# Patient Record
Sex: MALE | Race: BLACK OR AFRICAN AMERICAN | URBAN - METROPOLITAN AREA
[De-identification: names, ages, dates, MRNs, and addresses within clinical notes are randomized per-mention and may not be internally consistent; named-entity substitution may affect disease eponyms.]

---

## 2024-05-14 DIAGNOSIS — G56.02 LEFT CARPAL TUNNEL SYNDROME: Primary | ICD-10-CM

## 2024-06-03 ENCOUNTER — PRE-ADMISSION TESTING (OUTPATIENT)
Dept: PREADMISSION TESTING | Facility: HOSPITAL | Age: 74
End: 2024-06-03
Payer: COMMERCIAL

## 2024-06-03 ENCOUNTER — LAB (OUTPATIENT)
Dept: LAB | Facility: LAB | Age: 74
End: 2024-06-03
Payer: COMMERCIAL

## 2024-06-03 VITALS
HEART RATE: 81 BPM | HEIGHT: 72 IN | TEMPERATURE: 96.8 F | SYSTOLIC BLOOD PRESSURE: 133 MMHG | DIASTOLIC BLOOD PRESSURE: 78 MMHG | OXYGEN SATURATION: 94 % | RESPIRATION RATE: 16 BRPM | WEIGHT: 291.01 LBS | BODY MASS INDEX: 39.42 KG/M2

## 2024-06-03 DIAGNOSIS — Z01.818 PRE-OP TESTING: Primary | ICD-10-CM

## 2024-06-03 DIAGNOSIS — Z01.818 PRE-OP TESTING: ICD-10-CM

## 2024-06-03 DIAGNOSIS — G56.02 LEFT CARPAL TUNNEL SYNDROME: ICD-10-CM

## 2024-06-03 LAB
ANION GAP SERPL CALC-SCNC: 13 MMOL/L (ref 10–20)
BUN SERPL-MCNC: 13 MG/DL (ref 6–23)
CALCIUM SERPL-MCNC: 10.4 MG/DL (ref 8.6–10.3)
CHLORIDE SERPL-SCNC: 91 MMOL/L (ref 98–107)
CO2 SERPL-SCNC: 32 MMOL/L (ref 21–32)
CREAT SERPL-MCNC: 1.12 MG/DL (ref 0.5–1.3)
EGFRCR SERPLBLD CKD-EPI 2021: 70 ML/MIN/1.73M*2
GLUCOSE SERPL-MCNC: 94 MG/DL (ref 74–99)
POTASSIUM SERPL-SCNC: 4.7 MMOL/L (ref 3.5–5.3)
SODIUM SERPL-SCNC: 131 MMOL/L (ref 136–145)

## 2024-06-03 PROCEDURE — 36415 COLL VENOUS BLD VENIPUNCTURE: CPT

## 2024-06-03 PROCEDURE — 80048 BASIC METABOLIC PNL TOTAL CA: CPT

## 2024-06-03 RX ORDER — ATORVASTATIN CALCIUM 10 MG/1
10 TABLET, FILM COATED ORAL DAILY
COMMUNITY

## 2024-06-03 RX ORDER — KETOCONAZOLE 20 MG/ML
1 SHAMPOO, SUSPENSION TOPICAL 2 TIMES WEEKLY
COMMUNITY

## 2024-06-03 RX ORDER — FERROUS SULFATE 325(65) MG
325 TABLET ORAL DAILY
COMMUNITY

## 2024-06-03 RX ORDER — AMLODIPINE BESYLATE 10 MG/1
10 TABLET ORAL DAILY
COMMUNITY

## 2024-06-03 RX ORDER — URSODIOL 300 MG/1
300 CAPSULE ORAL 2 TIMES DAILY
COMMUNITY

## 2024-06-03 RX ORDER — PREDNISONE 5 MG/1
5 TABLET ORAL DAILY
COMMUNITY

## 2024-06-03 RX ORDER — FLUOCINONIDE TOPICAL SOLUTION USP, 0.05% 0.5 MG/ML
1 SOLUTION TOPICAL 2 TIMES DAILY PRN
COMMUNITY

## 2024-06-03 RX ORDER — CHOLECALCIFEROL (VITAMIN D3) 1250 MCG
50000 TABLET ORAL
COMMUNITY

## 2024-06-03 RX ORDER — POTASSIUM CHLORIDE 20 MEQ/1
20 TABLET, EXTENDED RELEASE ORAL DAILY
COMMUNITY

## 2024-06-03 RX ORDER — COLCHICINE 0.6 MG/1
0.6 TABLET ORAL DAILY
COMMUNITY

## 2024-06-03 RX ORDER — HYDROCHLOROTHIAZIDE 12.5 MG/1
12.5 CAPSULE ORAL DAILY
COMMUNITY

## 2024-06-03 RX ORDER — FUROSEMIDE 40 MG/1
40 TABLET ORAL DAILY
COMMUNITY

## 2024-06-03 RX ORDER — LISINOPRIL 40 MG/1
40 TABLET ORAL DAILY
COMMUNITY

## 2024-06-03 RX ORDER — ALBUTEROL SULFATE 90 UG/1
2 AEROSOL, METERED RESPIRATORY (INHALATION) EVERY 4 HOURS PRN
COMMUNITY

## 2024-06-03 RX ORDER — PANTOPRAZOLE SODIUM 40 MG/1
40 TABLET, DELAYED RELEASE ORAL
COMMUNITY

## 2024-06-03 RX ORDER — TRAMADOL HYDROCHLORIDE 50 MG/1
50 TABLET ORAL EVERY 6 HOURS PRN
COMMUNITY

## 2024-06-03 RX ORDER — FOLIC ACID 1 MG/1
1 TABLET ORAL DAILY
COMMUNITY

## 2024-06-03 RX ORDER — ALLOPURINOL 300 MG/1
300 TABLET ORAL DAILY
COMMUNITY

## 2024-06-03 RX ORDER — DOXAZOSIN 4 MG/1
4 TABLET ORAL NIGHTLY
COMMUNITY

## 2024-06-03 RX ORDER — TIOTROPIUM BROMIDE INHALATION SPRAY 3.12 UG/1
2 SPRAY, METERED RESPIRATORY (INHALATION) DAILY
COMMUNITY

## 2024-06-03 ASSESSMENT — DUKE ACTIVITY SCORE INDEX (DASI)
CAN YOU CLIMB A FLIGHT OF STAIRS OR WALK UP A HILL: YES
CAN YOU DO LIGHT WORK AROUND THE HOUSE LIKE DUSTING OR WASHING DISHES: YES
CAN YOU WALK INDOORS, SUCH AS AROUND YOUR HOUSE: YES
CAN YOU DO YARD WORK LIKE RAKING LEAVES, WEEDING OR PUSHING A MOWER: NO
CAN YOU TAKE CARE OF YOURSELF (EAT, DRESS, BATHE, OR USE TOILET): YES
CAN YOU DO MODERATE WORK AROUND THE HOUSE LIKE VACUUMING, SWEEPING FLOORS OR CARRYING GROCERIES: YES
CAN YOU RUN A SHORT DISTANCE: NO
CAN YOU HAVE SEXUAL RELATIONS: NO
DASI METS SCORE: 4.7
CAN YOU WALK A BLOCK OR TWO ON LEVEL GROUND: NO
CAN YOU DO HEAVY WORK AROUND THE HOUSE LIKE SCRUBBING FLOORS OR LIFTING AND MOVING HEAVY FURNITURE: NO
CAN YOU PARTICIPATE IN STRENOUS SPORTS LIKE SWIMMING, SINGLES TENNIS, FOOTBALL, BASKETBALL, OR SKIING: NO
CAN YOU PARTICIPATE IN MODERATE RECREATIONAL ACTIVITIES LIKE GOLF, BOWLING, DANCING, DOUBLES TENNIS OR THROWING A BASEBALL OR FOOTBALL: NO
TOTAL_SCORE: 16.2

## 2024-06-03 ASSESSMENT — LIFESTYLE VARIABLES: SMOKING_STATUS: NONSMOKER

## 2024-06-03 ASSESSMENT — PAIN - FUNCTIONAL ASSESSMENT: PAIN_FUNCTIONAL_ASSESSMENT: 0-10

## 2024-06-03 ASSESSMENT — ACTIVITIES OF DAILY LIVING (ADL): ADL_SCORE: 0

## 2024-06-03 NOTE — CPM/PAT H&P
CPM/PAT Evaluation       Name: Dmitriy Hobbs (Dmitriy Hobbs)  /Age: 1950/73 y.o.     In-Person       Chief Complaint: left hand pains with limited ROM     HPI    SAULO is a 74 yo male who has had left hand numbness and tingling with limited ROM for about one year now and symptoms are worsening causing pain. He was evaluated by hand surgeon with testing completed confirming carpal tunnel syndrome. Subsequently he is scheduled for left decompression of median nerve with carpal tunnel release on 2024 with Dr. Melendez. Denies any recent steroid injections. Skin intact to surgical hand. Otherwise denies any recent illness, fever/chills, chest pains or shortness of breath.     Past Medical History:   Diagnosis Date    Arthritis     Asthma (HHS-HCC)     BMI 39.0-39.9,adult     BPH (benign prostatic hyperplasia)     Carpal tunnel syndrome     CHF (congestive heart failure) (Multi)     Chronic pain disorder     COPD (chronic obstructive pulmonary disease) (Multi)     Disease of thyroid gland     Hyperlipidemia     Hypertension     Rheumatoid arthritis (Multi)     Sleep apnea      PCP: Dr. Le  Cards: Dr. Ramírez  GI: Dr. Thibodeaux  Rheum: Dr. Peña    EKG 2024  Sinus rhythm   Right bundle branch block   Inferior infarct, old   Abnormal ECG   no change 22     Past Surgical History:   Procedure Laterality Date    COLONOSCOPY      KNEE SURGERY      SHOULDER SURGERY         Patient  has no history on file for sexual activity.    Family History   Problem Relation Name Age of Onset    Cancer Mother         No Known Allergies    Prior to Admission medications    Not on File        PAT ROS   Constitutional: Negative for fever, chills, or sweats   ENMT: Negative for nasal discharge, congestion, ear pain, mouth pain, throat pain; + eyeglasses  Respiratory: Negative for cough, wheezing, shortness of breath   Cardiac: Negative for chest pain, dyspnea on exertion, palpitations   Gastrointestinal: Negative for nausea,  vomiting, diarrhea, constipation, abdominal pain  Genitourinary: Negative for dysuria, flank pain, frequency, hematuria     Musculoskeletal: Positive for decreased ROM, pain, swelling, weakness with numbness & tingling in left hand hand    Neurological: Negative for dizziness, confusion, headache  Psychiatric: Negative for mood changes   Skin: Negative for itching, rash, ulcer    Hematologic/Lymph: Negative for bruising, easy bleeding  Allergic/Immunologic: Negative itching, sneezing, swelling      Physical Exam  Constitutional:       Appearance: Normal appearance. He is obese.   HENT:      Head: Normocephalic.      Mouth/Throat:      Mouth: Mucous membranes are moist.   Eyes:      Extraocular Movements: Extraocular movements intact.   Cardiovascular:      Rate and Rhythm: Normal rate and regular rhythm.   Pulmonary:      Effort: Pulmonary effort is normal.      Breath sounds: Normal breath sounds.   Abdominal:      General: Abdomen is flat.      Palpations: Abdomen is soft.   Musculoskeletal:      Left wrist: Decreased range of motion.      Left hand: Decreased range of motion.      Cervical back: Normal range of motion.   Skin:     General: Skin is warm and dry.   Neurological:      General: No focal deficit present.      Mental Status: He is alert.   Psychiatric:         Mood and Affect: Mood normal.        PAT AIRWAY:   Airway:     Neck ROM::  Full   Missing from extractions  normal      Anesthesia:  Patient denies any anesthesia complications.     Visit Vitals  /78   Pulse 81   Temp 36 °C (96.8 °F) (Temporal)   Resp 16       DASI Risk Score      Flowsheet Row Most Recent Value   DASI SCORE 16.2   METS Score (Will be calculated only when all the questions are answered) 4.7          Caprini DVT Assessment      Flowsheet Row Most Recent Value   DVT Score 10   Current Status COPD, Minor surgery planned   History Prior major surgery, Congestive heart failure, COPD   Age 60-75 years   BMI 31-40 (Obesity)           Modified Frailty Index      Flowsheet Row Most Recent Value   Modified Frailty Index Calculator .2727          CHADS2 Stroke Risk  Current as of 29 minutes ago        N/A 3 to 100%: High Risk   2 to < 3%: Medium Risk   0 to < 2%: Low Risk     Last Change: N/A          This score determines the patient's risk of having a stroke if the patient has atrial fibrillation.        This score is not applicable to this patient. Components are not calculated.          Revised Cardiac Risk Index    No data to display       Apfel Simplified Score    No data to display       Risk Analysis Index Results This Encounter         6/3/2024  1143             CHAUHAN Cancer History: Patient does not indicate history of cancer    Total Risk Analysis Index Score Without Cancer: 33    Total Risk Analysis Index Score: 33          Stop Bang Score      Flowsheet Row Most Recent Value   Do you snore loudly? 1   Do you often feel tired or fatigued after your sleep? 0   Has anyone ever observed you stop breathing in your sleep? 1   Do you have or are you being treated for high blood pressure? 1   Recent BMI (Calculated) 39.5   Is BMI greater than 35 kg/m2? 1=Yes   Age older than 50 years old? 1=Yes   Is your neck circumference greater than 17 inches (Male) or 16 inches (Female)? 1   Gender - Male 1=Yes   STOP-BANG Total Score 7            Assessment and Plan:     Pre-Op  74 yo male scheduled for left decompression of medial nerve with carpal tunnel release on 6/17/2024. BMP ordered. EKG on file from Jan 2024 with no acute changes. Otherwise no further orders indicated.     Cardiac  HTN, HLD, CHF - follows with Dr. Ramírez  -managed and compliant with statin, lasix, hydrochlorothiazide, lisinopril, and metoprolol  RCI 1  DASI 16.2/  METS 4.7    Pulm  COPD, Sleep Apnea- follows with Dr. Thompson  -managed and compliant with Spiriva and Albuterol PRN, uses Albuterol 0-3 times/day,   -Not using cpap, has current new sleep study ordered  -wears 1-2 L NC  at night     Musc  Rheumatoid arthritis, chronic pain- follows with rheumatology, Dr. Peña  -managed with prednisone 5 mg daily and Tramadol for pain     Anesthesia:  Patient denies any anesthesia complications.   -BMI 39.47  ASA 3: A to review    See other risk scores as previously documented.

## 2024-06-03 NOTE — PREPROCEDURE INSTRUCTIONS
Medication List            Accurate as of Chantal 3, 2024 11:49 AM. Always use your most recent med list.                allopurinol 300 mg tablet  Commonly known as: Zyloprim  Medication Adjustments for Surgery: Take morning of surgery with sip of water, no other fluids     amLODIPine 10 mg tablet  Commonly known as: Norvasc  Medication Adjustments for Surgery: Take morning of surgery with sip of water, no other fluids     atorvastatin 10 mg tablet  Commonly known as: Lipitor  Medication Adjustments for Surgery: Other (Comment)  Notes to patient: May take the morning of surgery if this medication is prescribed to take in the mornings     cholecalciferol 1,250 mcg (50,000 unit) tablet  Commonly known as: Vitamin D3  Medication Adjustments for Surgery: Stop 7 days before surgery     colchicine 0.6 mg tablet  Medication Adjustments for Surgery: Other (Comment)  Notes to patient: Coordinate with prescribing provider for further instructions on this medications prior to surgery.     doxazosin 4 mg tablet  Commonly known as: Cardura  Medication Adjustments for Surgery: Take morning of surgery with sip of water, no other fluids     ferrous sulfate (325 mg ferrous sulfate) tablet  Medication Adjustments for Surgery: Continue until night before surgery     fluocinonide 0.05 % external solution  Commonly known as: Lidex  Medication Adjustments for Surgery: Continue until night before surgery     folic acid 1 mg tablet  Commonly known as: Folvite  Medication Adjustments for Surgery: Continue until night before surgery     furosemide 40 mg tablet  Commonly known as: Lasix  Medication Adjustments for Surgery: Continue until night before surgery     hydroCHLOROthiazide 12.5 mg capsule  Commonly known as: Microzide  Medication Adjustments for Surgery: Take morning of surgery with sip of water, no other fluids     ketoconazole 2 % shampoo  Commonly known as: NIZOral  Medication Adjustments for Surgery: Continue until night before  surgery     Linzess 145 mcg capsule  Generic drug: linaCLOtide  Medication Adjustments for Surgery: Continue until night before surgery     lisinopril 40 mg tablet  Medication Adjustments for Surgery: Other (Comment)  Notes to patient: HOLD any evening dose the night before the day of surgery  HOLD the day of surgery  -should be discontinued for a minimum of 24 hours before surgery     pantoprazole 40 mg EC tablet  Commonly known as: ProtoNix  Medication Adjustments for Surgery: Take morning of surgery with sip of water, no other fluids     potassium chloride CR 20 mEq ER tablet  Commonly known as: Klor-Con M20  Medication Adjustments for Surgery: Continue until night before surgery     predniSONE 5 mg tablet  Commonly known as: Deltasone  Medication Adjustments for Surgery: Other (Comment)  Notes to patient: Coordinate with prescribing provider for further instructions on this medications prior to surgery.     Proventil HFA 90 mcg/actuation inhaler  Generic drug: albuterol  Medication Adjustments for Surgery: Other (Comment)  Notes to patient: May use the morning of surgery if needed     Spiriva Respimat 2.5 mcg/actuation inhaler  Generic drug: tiotropium  Medication Adjustments for Surgery: Other (Comment)  Notes to patient: May use the morning of surgery if needed     traMADol 50 mg tablet  Commonly known as: Ultram  Medication Adjustments for Surgery: Take morning of surgery with sip of water, no other fluids     ursodiol 300 mg capsule  Commonly known as: Actigall  Medication Adjustments for Surgery: Continue until night before surgery                                PRE-OPERATIVE INSTRUCTIONS    You will receive notification one business day prior to your procedure to confirm your arrival time. It is important that you answer your phone and/or check your messages during this time. If you do not hear from the surgery center by 5 pm. the day before your procedure, please call [phone].     Please enter the  building through the Outpatient entrance and take the elevator off the lobby to the 2nd floor then check in at the Outpatient Surgery desk on the 2nd floor.    INSTRUCTIONS:  Talk to your surgeon for instructions if you should stop your aspirin, blood thinner, or diabetes medicines.  DO NOT take any multivitamins or over the counter supplements for 7-10 days before surgery.  If not being admitted, you must have an adult immediately available to drive you home after surgery. We also highly recommend you have someone stay with you for the entire day and night of your surgery.  For children having surgery, a parent or legal guardian must accompany them to the surgery center. If this is not possible, please call [phone] to make additional arrangements.  For adults who are unable to consent or make medical decisions for themselves, a legal guardian or Power of  must accompany them to the surgery center. If this is not possible, please call [phone] to make additional arrangements.  Wear comfortable, loose fitting clothing.  All jewelry and piercings must be removed. If you are unable to remove an item or have a dermal piercing, please be sure to tell the nurse when you arrive for surgery.  Nail polish and make-up must be removed.  Avoid smoking or consuming alcohol for 24 hours before surgery.  To help prevent infection, please take a shower/bath and wash your hair the night before and/or morning of surgery (or follow other specific bathing instructions provided).    Preoperative Fasting Guidelines    Why must I stop eating and drinking near surgery time?  With sedation, food or liquid in your stomach can enter your lungs causing serious complications  Increases nausea and vomiting    When do I need to stop eating and drinking before my surgery?  Do not eat any solid food after midnight the night before your surgery/procedure unless otherwise instructed by your surgeon.   You may have up to 13.5 ounces  of clear liquid until TWO hours before your instructed arrival time to the hospital.  This includes water, black tea/coffee, (no milk or cream) apple juice, and electrolyte drinks (Gatorade).   You may chew gum until TWO hours before your surgery/procedure      If applicable, notify your surgeons office immediately of any new skin changes that occur to the surgical limb.      If you have any questions or concerns, please call Pre-Admission Testing at [phone].       Home Preoperative Antibacterial Shower with Chlorhexidine gluconate (CHG)     What is a home preoperative antibacterial shower?  This shower is a way of cleaning the skin with a germ killing solution before surgery. The solution contains chlorhexidine gluconate, commonly known as CHG. CHG is a skin cleanser with germ killing ability. Let your doctor know if you are allergic to chlorhexidine.    Why do I need to take a preoperative antibacterial shower?  Skin is not sterile. It is best to try to make your skin as free of germs as possible before surgery. Proper cleansing with a germ killing soap before surgery can lower the number of germs on your skin. This helps to reduce the risk of infection at the surgical site. Following the instructions listed below will help you prepare your skin for surgery.    How do I use the solution?  Begin using your CHG soap the night before and again the morning of your procedure.   Do not shave the day before or day of surgery.  Remove all jewelry until after surgery. Take off rings and take out all body-piercing jewelry.  Wash your face and hair with normal soap and shampoo before you use the CHG soap.  Apply the CHG solution to a clean wet washcloth. Move away from the water to avoid premature rinsing of the CHG soap as you are applying. Firmly lather your entire body from the neck down. Do not use CHG on your face, eyes, ears, or genitals.   Pay special attention to the area where your incisions will be  located.  Do not scrub your skin too hard.  It is important to allow the CHG soap to sit on your skin for 3-5 minutes.  Rinse the solution off your body completely. Do not wash with your normal soap after the CHG soap solution.  Pat yourself dry with a clean, soft towel.  Do not apply powders, lotions or deodorants as these might block how the CHG soap works.   Dress in clean clothing.  Be sure to sleep with clean, freshly laundered sheets.  Be aware that CHG can cause stains on fabric. Rinse your washcloth and other linens that have contact with CHG completely. Use only non-chlorine detergents to launder the items used.

## 2024-06-03 NOTE — H&P (VIEW-ONLY)
CPM/PAT Evaluation       Name: Dmitriy Hobbs (Dmitriy Hobbs)  /Age: 1950/73 y.o.     In-Person       Chief Complaint: left hand pains with limited ROM     HPI    SAULO is a 74 yo male who has had left hand numbness and tingling with limited ROM for about one year now and symptoms are worsening causing pain. He was evaluated by hand surgeon with testing completed confirming carpal tunnel syndrome. Subsequently he is scheduled for left decompression of median nerve with carpal tunnel release on 2024 with Dr. Melendez. Denies any recent steroid injections. Skin intact to surgical hand. Otherwise denies any recent illness, fever/chills, chest pains or shortness of breath.     Past Medical History:   Diagnosis Date    Arthritis     Asthma (HHS-HCC)     BMI 39.0-39.9,adult     BPH (benign prostatic hyperplasia)     Carpal tunnel syndrome     CHF (congestive heart failure) (Multi)     Chronic pain disorder     COPD (chronic obstructive pulmonary disease) (Multi)     Disease of thyroid gland     Hyperlipidemia     Hypertension     Rheumatoid arthritis (Multi)     Sleep apnea      PCP: Dr. Le  Cards: Dr. Ramírez  GI: Dr. Thibodeaux  Rheum: Dr. Peña    EKG 2024  Sinus rhythm   Right bundle branch block   Inferior infarct, old   Abnormal ECG   no change 22     Past Surgical History:   Procedure Laterality Date    COLONOSCOPY      KNEE SURGERY      SHOULDER SURGERY         Patient  has no history on file for sexual activity.    Family History   Problem Relation Name Age of Onset    Cancer Mother         No Known Allergies    Prior to Admission medications    Not on File        PAT ROS   Constitutional: Negative for fever, chills, or sweats   ENMT: Negative for nasal discharge, congestion, ear pain, mouth pain, throat pain; + eyeglasses  Respiratory: Negative for cough, wheezing, shortness of breath   Cardiac: Negative for chest pain, dyspnea on exertion, palpitations   Gastrointestinal: Negative for nausea,  vomiting, diarrhea, constipation, abdominal pain  Genitourinary: Negative for dysuria, flank pain, frequency, hematuria     Musculoskeletal: Positive for decreased ROM, pain, swelling, weakness with numbness & tingling in left hand hand    Neurological: Negative for dizziness, confusion, headache  Psychiatric: Negative for mood changes   Skin: Negative for itching, rash, ulcer    Hematologic/Lymph: Negative for bruising, easy bleeding  Allergic/Immunologic: Negative itching, sneezing, swelling      Physical Exam  Constitutional:       Appearance: Normal appearance. He is obese.   HENT:      Head: Normocephalic.      Mouth/Throat:      Mouth: Mucous membranes are moist.   Eyes:      Extraocular Movements: Extraocular movements intact.   Cardiovascular:      Rate and Rhythm: Normal rate and regular rhythm.   Pulmonary:      Effort: Pulmonary effort is normal.      Breath sounds: Normal breath sounds.   Abdominal:      General: Abdomen is flat.      Palpations: Abdomen is soft.   Musculoskeletal:      Left wrist: Decreased range of motion.      Left hand: Decreased range of motion.      Cervical back: Normal range of motion.   Skin:     General: Skin is warm and dry.   Neurological:      General: No focal deficit present.      Mental Status: He is alert.   Psychiatric:         Mood and Affect: Mood normal.        PAT AIRWAY:   Airway:     Neck ROM::  Full   Missing from extractions  normal      Anesthesia:  Patient denies any anesthesia complications.     Visit Vitals  /78   Pulse 81   Temp 36 °C (96.8 °F) (Temporal)   Resp 16       DASI Risk Score      Flowsheet Row Most Recent Value   DASI SCORE 16.2   METS Score (Will be calculated only when all the questions are answered) 4.7          Caprini DVT Assessment      Flowsheet Row Most Recent Value   DVT Score 10   Current Status COPD, Minor surgery planned   History Prior major surgery, Congestive heart failure, COPD   Age 60-75 years   BMI 31-40 (Obesity)           Modified Frailty Index      Flowsheet Row Most Recent Value   Modified Frailty Index Calculator .2727          CHADS2 Stroke Risk  Current as of 29 minutes ago        N/A 3 to 100%: High Risk   2 to < 3%: Medium Risk   0 to < 2%: Low Risk     Last Change: N/A          This score determines the patient's risk of having a stroke if the patient has atrial fibrillation.        This score is not applicable to this patient. Components are not calculated.          Revised Cardiac Risk Index    No data to display       Apfel Simplified Score    No data to display       Risk Analysis Index Results This Encounter         6/3/2024  1143             CHAUHAN Cancer History: Patient does not indicate history of cancer    Total Risk Analysis Index Score Without Cancer: 33    Total Risk Analysis Index Score: 33          Stop Bang Score      Flowsheet Row Most Recent Value   Do you snore loudly? 1   Do you often feel tired or fatigued after your sleep? 0   Has anyone ever observed you stop breathing in your sleep? 1   Do you have or are you being treated for high blood pressure? 1   Recent BMI (Calculated) 39.5   Is BMI greater than 35 kg/m2? 1=Yes   Age older than 50 years old? 1=Yes   Is your neck circumference greater than 17 inches (Male) or 16 inches (Female)? 1   Gender - Male 1=Yes   STOP-BANG Total Score 7            Assessment and Plan:     Pre-Op  72 yo male scheduled for left decompression of medial nerve with carpal tunnel release on 6/17/2024. BMP ordered. EKG on file from Jan 2024 with no acute changes. Otherwise no further orders indicated.     Cardiac  HTN, HLD, CHF - follows with Dr. Ramírez  -managed and compliant with statin, lasix, hydrochlorothiazide, lisinopril, and metoprolol  RCI 1  DASI 16.2/  METS 4.7    Pulm  COPD, Sleep Apnea- follows with Dr. Thompson  -managed and compliant with Spiriva and Albuterol PRN, uses Albuterol 0-3 times/day,   -Not using cpap, has current new sleep study ordered  -wears 1-2 L NC  at night     Musc  Rheumatoid arthritis, chronic pain- follows with rheumatology, Dr. Peña  -managed with prednisone 5 mg daily and Tramadol for pain     Anesthesia:  Patient denies any anesthesia complications.   -BMI 39.47  ASA 3: A to review    See other risk scores as previously documented.

## 2024-06-04 DIAGNOSIS — E87.1 HYPONATREMIA: ICD-10-CM

## 2024-06-04 DIAGNOSIS — Z01.818 PRE-OP TESTING: Primary | ICD-10-CM

## 2024-06-17 ENCOUNTER — ANESTHESIA EVENT (OUTPATIENT)
Dept: OPERATING ROOM | Facility: HOSPITAL | Age: 74
End: 2024-06-17
Payer: COMMERCIAL

## 2024-06-17 ENCOUNTER — HOSPITAL ENCOUNTER (OUTPATIENT)
Dept: CARDIOLOGY | Facility: HOSPITAL | Age: 74
Discharge: HOME | End: 2024-06-17
Payer: COMMERCIAL

## 2024-06-17 ENCOUNTER — HOSPITAL ENCOUNTER (OUTPATIENT)
Facility: HOSPITAL | Age: 74
Setting detail: OUTPATIENT SURGERY
Discharge: HOME | End: 2024-06-17
Payer: COMMERCIAL

## 2024-06-17 ENCOUNTER — DOCUMENTATION (OUTPATIENT)
Dept: SURGERY | Facility: HOSPITAL | Age: 74
End: 2024-06-17
Payer: COMMERCIAL

## 2024-06-17 ENCOUNTER — ANESTHESIA (OUTPATIENT)
Dept: OPERATING ROOM | Facility: HOSPITAL | Age: 74
End: 2024-06-17
Payer: COMMERCIAL

## 2024-06-17 VITALS
TEMPERATURE: 98.2 F | WEIGHT: 288 LBS | RESPIRATION RATE: 18 BRPM | DIASTOLIC BLOOD PRESSURE: 73 MMHG | HEIGHT: 72 IN | SYSTOLIC BLOOD PRESSURE: 127 MMHG | BODY MASS INDEX: 39.01 KG/M2 | OXYGEN SATURATION: 94 % | HEART RATE: 69 BPM

## 2024-06-17 LAB
ANION GAP SERPL CALC-SCNC: 10 MMOL/L (ref 10–20)
ATRIAL RATE: 73 BPM
BUN SERPL-MCNC: 15 MG/DL (ref 6–23)
CALCIUM SERPL-MCNC: 9.9 MG/DL (ref 8.6–10.3)
CARDIAC TROPONIN I PNL SERPL HS: 5 NG/L (ref 0–20)
CHLORIDE SERPL-SCNC: 98 MMOL/L (ref 98–107)
CO2 SERPL-SCNC: 30 MMOL/L (ref 21–32)
CREAT SERPL-MCNC: 1.12 MG/DL (ref 0.5–1.3)
EGFRCR SERPLBLD CKD-EPI 2021: 70 ML/MIN/1.73M*2
ERYTHROCYTE [DISTWIDTH] IN BLOOD BY AUTOMATED COUNT: 13.5 % (ref 11.5–14.5)
GLUCOSE SERPL-MCNC: 94 MG/DL (ref 74–99)
HCT VFR BLD AUTO: 45.7 % (ref 41–52)
HGB BLD-MCNC: 14.6 G/DL (ref 13.5–17.5)
MAGNESIUM SERPL-MCNC: 1.69 MG/DL (ref 1.6–2.4)
MCH RBC QN AUTO: 28.1 PG (ref 26–34)
MCHC RBC AUTO-ENTMCNC: 31.9 G/DL (ref 32–36)
MCV RBC AUTO: 88 FL (ref 80–100)
NRBC BLD-RTO: 0 /100 WBCS (ref 0–0)
P AXIS: 74 DEGREES
P OFFSET: 166 MS
P ONSET: 107 MS
PLATELET # BLD AUTO: 257 X10*3/UL (ref 150–450)
POTASSIUM SERPL-SCNC: 4.1 MMOL/L (ref 3.5–5.3)
PR INTERVAL: 172 MS
Q ONSET: 193 MS
QRS COUNT: 12 BEATS
QRS DURATION: 140 MS
QT INTERVAL: 440 MS
QTC CALCULATION(BAZETT): 484 MS
QTC FREDERICIA: 470 MS
R AXIS: -89 DEGREES
RBC # BLD AUTO: 5.19 X10*6/UL (ref 4.5–5.9)
SODIUM SERPL-SCNC: 134 MMOL/L (ref 136–145)
T AXIS: 40 DEGREES
T OFFSET: 413 MS
VENTRICULAR RATE: 73 BPM
WBC # BLD AUTO: 7.2 X10*3/UL (ref 4.4–11.3)

## 2024-06-17 PROCEDURE — 80048 BASIC METABOLIC PNL TOTAL CA: CPT

## 2024-06-17 PROCEDURE — 93010 ELECTROCARDIOGRAM REPORT: CPT | Performed by: INTERNAL MEDICINE

## 2024-06-17 PROCEDURE — 83735 ASSAY OF MAGNESIUM: CPT

## 2024-06-17 PROCEDURE — 84484 ASSAY OF TROPONIN QUANT: CPT | Performed by: ANESTHESIOLOGY

## 2024-06-17 PROCEDURE — 93005 ELECTROCARDIOGRAM TRACING: CPT

## 2024-06-17 PROCEDURE — 85027 COMPLETE CBC AUTOMATED: CPT

## 2024-06-17 PROCEDURE — 36415 COLL VENOUS BLD VENIPUNCTURE: CPT

## 2024-06-17 RX ORDER — CEFAZOLIN SODIUM 2 G/100ML
2 INJECTION, SOLUTION INTRAVENOUS ONCE
OUTPATIENT
Start: 2024-06-18 | End: 2024-06-18

## 2024-06-17 RX ORDER — METOPROLOL TARTRATE 25 MG/1
25 TABLET, FILM COATED ORAL DAILY
COMMUNITY

## 2024-06-17 SDOH — HEALTH STABILITY: MENTAL HEALTH: CURRENT SMOKER: 0

## 2024-06-17 ASSESSMENT — PATIENT HEALTH QUESTIONNAIRE - PHQ9
SUM OF ALL RESPONSES TO PHQ9 QUESTIONS 1 AND 2: 0
2. FEELING DOWN, DEPRESSED OR HOPELESS: NOT AT ALL
1. LITTLE INTEREST OR PLEASURE IN DOING THINGS: NOT AT ALL

## 2024-06-17 ASSESSMENT — PAIN - FUNCTIONAL ASSESSMENT: PAIN_FUNCTIONAL_ASSESSMENT: 0-10

## 2024-06-17 ASSESSMENT — COLUMBIA-SUICIDE SEVERITY RATING SCALE - C-SSRS
6. HAVE YOU EVER DONE ANYTHING, STARTED TO DO ANYTHING, OR PREPARED TO DO ANYTHING TO END YOUR LIFE?: NO
1. IN THE PAST MONTH, HAVE YOU WISHED YOU WERE DEAD OR WISHED YOU COULD GO TO SLEEP AND NOT WAKE UP?: NO
2. HAVE YOU ACTUALLY HAD ANY THOUGHTS OF KILLING YOURSELF?: NO

## 2024-06-17 ASSESSMENT — PAIN SCALES - GENERAL: PAINLEVEL_OUTOF10: 0 - NO PAIN

## 2024-06-17 NOTE — PROGRESS NOTES
Mr. Dmitriy Lou was seen preoperatively in the preop area and stated he had a funny sensation in his chest.  He states this has been ongoing for over a month.  He states he has told several medical professionals including his most recent preop evaluation.  He denies any chest pain.  He denies any shortness of breath.  He denies any nausea or or vomiting.  He denies any other problems or complaints at this time.  His surgery was placed on hold and ECG and blood work was obtained.  ECG demonstrated what appeared to be no acute findings but normal sinus rhythm and right bundle amalia block consistent with what the history and physical previously described.  His blood work for CBC, BMP, magnesium, and troponin were all within normal limits.  Given that this is not an emergency surgery, after lengthy discussion with him, we agreed that the safest course of action would be to postpone his surgery and allow him to be evaluated by his cardiologist.  He states he will call his cardiologist today.  He is also instructed that he may go to the emergency department for further evaluation.  His wife is present during these discussions.  They state that they will go home and speak with his cardiologist office to direct further care.  Once he has obtained further evaluation and cardiac clearance we will then consider rescheduling his surgical intervention.  He and his wife understand his current clinical situation and treatment plan.  They will contact my office to arrange for surgery once he has had further evaluation, cardiac clearance, and feels better.  Thank you

## 2024-06-17 NOTE — ANESTHESIA PREPROCEDURE EVALUATION
Patient: Dmitriy Hobbs    Procedure Information       Date/Time: 06/17/24 1040    Procedure: Decompression Median Nerve with Carpal Tunnel Release (Left) - 60 min    Location: [facility]  / [facility] OR    Surgeons: Braden Melendez MD            Relevant Problems   Neuro   (+) Left carpal tunnel syndrome      Musculoskeletal   (+) Left carpal tunnel syndrome       Clinical information reviewed:   Tobacco  Allergies  Meds   Med Hx  Surg Hx   Fam Hx  Soc Hx        NPO Detail:  NPO/Void Status  Carbohydrate Drink Given Prior to Surgery? : N  Date of Last Liquid: 06/17/24  Time of Last Liquid: 0730  Date of Last Solid: 06/16/24  Time of Last Solid: 1800  Last Intake Type: Clear fluids  Time of Last Void: 0935         Physical Exam    Airway  Mallampati: II  TM distance: >3 FB     Cardiovascular   Rhythm: regular  Rate: normal     Dental - normal exam     Pulmonary   Breath sounds clear to auscultation     Abdominal            Anesthesia Plan    History of general anesthesia?: yes  History of complications of general anesthesia?: no    ASA 3     regional     The patient is not a current smoker.    intravenous induction   Anesthetic plan and risks discussed with patient.    Plan discussed with [facility].

## 2025-06-04 ENCOUNTER — OFFICE VISIT (OUTPATIENT)
Dept: ORTHOPEDIC SURGERY | Facility: HOSPITAL | Age: 75
End: 2025-06-04
Payer: COMMERCIAL

## 2025-06-04 ENCOUNTER — HOSPITAL ENCOUNTER (OUTPATIENT)
Dept: RADIOLOGY | Facility: HOSPITAL | Age: 75
Discharge: HOME | End: 2025-06-04
Payer: COMMERCIAL

## 2025-06-04 DIAGNOSIS — M79.642 LEFT HAND PAIN: ICD-10-CM

## 2025-06-04 DIAGNOSIS — M79.642 LEFT HAND PAIN: Primary | ICD-10-CM

## 2025-06-04 DIAGNOSIS — G56.02 CARPAL TUNNEL SYNDROME ON LEFT: ICD-10-CM

## 2025-06-04 PROCEDURE — 1036F TOBACCO NON-USER: CPT | Performed by: STUDENT IN AN ORGANIZED HEALTH CARE EDUCATION/TRAINING PROGRAM

## 2025-06-04 PROCEDURE — 73130 X-RAY EXAM OF HAND: CPT | Mod: LT

## 2025-06-04 PROCEDURE — 99203 OFFICE O/P NEW LOW 30 MIN: CPT | Performed by: STUDENT IN AN ORGANIZED HEALTH CARE EDUCATION/TRAINING PROGRAM

## 2025-06-04 PROCEDURE — 73130 X-RAY EXAM OF HAND: CPT | Mod: LEFT SIDE | Performed by: RADIOLOGY

## 2025-06-04 PROCEDURE — 1159F MED LIST DOCD IN RCRD: CPT | Performed by: STUDENT IN AN ORGANIZED HEALTH CARE EDUCATION/TRAINING PROGRAM

## 2025-06-04 PROCEDURE — 99202 OFFICE O/P NEW SF 15 MIN: CPT | Performed by: STUDENT IN AN ORGANIZED HEALTH CARE EDUCATION/TRAINING PROGRAM

## 2025-06-04 ASSESSMENT — PAIN - FUNCTIONAL ASSESSMENT: PAIN_FUNCTIONAL_ASSESSMENT: NO/DENIES PAIN

## 2025-06-04 NOTE — PROGRESS NOTES
"History of Present Illness:  The patient presents today for evaluation of side: bilateral hand pain.  This is worse on the left.  The patient endorses numbness and tingling predominantly radial three digits.  There is no current neck pain or cervical spine issues.  The patient has tried to the following interventions thus far: Rest, ice, elevation and bracing.  The patient notes the pain is worse with activity  and at night.    He states he had not EMG/nerve conduction study which demonstrated left carpal tunnel syndrome.  I do not have results of these however he was scheduled for left carpal tunnel release with Dr. Melendez.  this was canceled on the day of surgery because he had a\" funny\" feeling in his chest.  He subsequently saw cardiology.  Cardiology thought this was more related to congestion.    The patient denies any recent trauma.  The pain is sharp, electrical in nature.    He now noticed worsening symptoms to his left hand.  He notes nearly chronic numbness but is worse at night.  He notes he is shaking out his hands nightly without any relief.  He would like to pursue left carpal tunnel release.    Medical History[1]    Medication Documentation Review Audit       Reviewed by Katie Bravo LPN (Licensed Nurse) on 06/04/25 at 0950      Medication Order Taking? Sig Documenting Provider Last Dose Status   albuterol (Proventil HFA) 90 mcg/actuation inhaler [id number] No Inhale 2 puffs every 4 hours if needed for wheezing. Historical Provider, MD 6/16/2024 Active   allopurinol (Zyloprim) 300 mg tablet [id number] No Take 1 tablet (300 mg) by mouth once daily. Historical Provider, MD 6/16/2024 Active   amLODIPine (Norvasc) 10 mg tablet [id number] No Take 1 tablet (10 mg) by mouth once daily. Maddie Provider, MD 6/17/2024 Active   atorvastatin (Lipitor) 10 mg tablet [id number] No Take 1 tablet (10 mg) by mouth once daily. Historical Provider, MD 6/16/2024 Active   cholecalciferol (Vitamin D3) 1,250 mcg (50,000 " unit) tablet [id number] No Take 1 tablet (50,000 Units) by mouth 1 (one) time per week. Maddie Jasmine MD Past Week Active   colchicine 0.6 mg tablet [id number] No Take 1 tablet (0.6 mg) by mouth once daily. Maddie Jasmine MD Past Week Active   doxazosin (Cardura) 4 mg tablet [id number] No Take 1 tablet (4 mg) by mouth once daily at bedtime. Maddie Jasmine MD Past Week Active   ferrous sulfate, 325 mg ferrous sulfate, tablet [id number] No Take 1 tablet by mouth once daily. Maddie Jasmine MD Past Week Active   fluocinonide (Lidex) 0.05 % external solution [id number] No Apply 1 Application topically 2 times a day as needed for irritation (scalp itching). Maddie Jasmine MD Past Week Active   folic acid (Folvite) 1 mg tablet [id number] No Take 1 tablet (1 mg) by mouth once daily. Maddie Jasmine MD 6/16/2024 Active   furosemide (Lasix) 40 mg tablet [id number] No Take 1 tablet (40 mg) by mouth once daily. Maddie Jasmine MD 6/16/2024 Active   hydroCHLOROthiazide (Microzide) 12.5 mg capsule [id number] No Take 1 capsule (12.5 mg) by mouth once daily. Maddie Jasmine MD Not Taking Active   ketoconazole (NIZOral) 2 % shampoo [id number] No Apply 1 Application topically 2 times a week. Maddie Jasmine MD Past Week Active   linaCLOtide (Linzess) 145 mcg capsule [id number] No Take 1 capsule (145 mcg) by mouth once daily in the morning. Take before meals. Do not crush or chew. Maddie Jasmine MD Past Week Active   lisinopril 40 mg tablet [id number] No Take 1 tablet (40 mg) by mouth once daily. Maddie Jasmine MD Past Week Active   metoprolol tartrate (Lopressor) 25 mg tablet [id number] No Take 1 tablet (25 mg) by mouth once daily. Maddie Jasmine MD 6/16/2024 Active   pantoprazole (ProtoNix) 40 mg EC tablet [id number] No Take 1 tablet (40 mg) by mouth once daily in the morning. Take before meals. Do not crush, chew, or split. Maddie Jasmine MD 6/16/2024 Active   potassium  chloride CR 20 mEq ER tablet [id number] No Take 1 tablet (20 mEq) by mouth once daily. Do not crush or chew. Historical Provider, MD 2024 Active   predniSONE (Deltasone) 5 mg tablet [id number] No Take 1 tablet (5 mg) by mouth once daily. Historical Provider, MD 2024 Active   tiotropium (Spiriva Respimat) 2.5 mcg/actuation inhaler [id number] No Inhale 2 puffs once daily. Historical Provider, MD Past Week Active   traMADol (Ultram) 50 mg tablet [id number] No Take 1 tablet (50 mg) by mouth every 6 hours if needed for severe pain (7 - 10). Historical Provider, MD 2024 Active   ursodiol (Actigall) 300 mg capsule [id number] No Take 1 capsule (300 mg) by mouth 2 times a day. Historical Provider, MD 2024 Active                    RX Allergies[2]    Social History     Socioeconomic History    Marital status: Single     Spouse name: Not on file    Number of children: Not on file    Years of education: Not on file    Highest education level: Not on file   Occupational History    Not on file   Tobacco Use    Smoking status: Former     Current packs/day: 0.00     Types: Cigarettes     Quit date: 2016     Years since quittin.4    Smokeless tobacco: Never   Vaping Use    Vaping status: Never Used   Substance and Sexual Activity    Alcohol use: Yes     Alcohol/week: 14.0 standard drinks of alcohol     Types: 14 Cans of beer per week    Drug use: Never    Sexual activity: Not on file   Other Topics Concern    Not on file   Social History Narrative    Not on file     Social Drivers of Health     Financial Resource Strain: Not on file   Food Insecurity: Not on file   Transportation Needs: Not on file   Physical Activity: Not on file   Stress: Not on file   Social Connections: Not on file   Intimate Partner Violence: Not on file   Housing Stability: Not on file       Surgical History[3]       Yes History of diabetes or hypothyroidism.     Review of Systems   GENERAL: Negative for malaise, significant weight loss,  fever  MUSCULOSKELETAL: see HPI  NEURO:  Negative     Physical Examination:  No tenderness to palpation cervical spine  Good ROM of neck  Negative Spurlings test     side: left wrist/hand:  No obvious swelling or masses  Thenar eminence muscle mass: Mild atrophy  No atrophy noted of hypothenar eminence   Equivocal Tinel's at carpal tunnel  + Median nerve compression test  + Phalen's  Decreased sensation to light touch in median nerve distribution  Motor exam within normal limits  Radial pulse palpable  Good capillary refill     EMG: Per patient positive for carpal tunnel syndrome    Additional studies: None     Assessment:  Patient with side: left carpal tunnel syndrome     Plan:  We reviewed the disease process with the patient.  We discussed the role for electrodiagnostic testing and treatment decision making, immobilization, and injections.  We discussed surgical intervention reviewing the risks (neurologic injury, wound issues including infection, pillar pain, and recurrence) and benefits.  The patient elected for: Carpal Tunnel Release Surgery.    For Surgical Planning:  Diagnosis: Left carpal tunnel syndrome  Procedure: Left carpal tunnel release  CPT: 97704  Anesthesia: MAC  Duration: 30 minutes  Special Equipment Needed: None  [facility]  Initial Post Operative Visit: 2 weeks                [1]   Past Medical History:  Diagnosis Date    Arthritis     Asthma     BMI 39.0-39.9,adult     BPH (benign prostatic hyperplasia)     Carpal tunnel syndrome     CHF (congestive heart failure)     Chronic pain disorder     COPD (chronic obstructive pulmonary disease) (Multi)     Disease of thyroid gland     Hyperlipidemia     Hypertension     Rheumatoid arthritis     Sleep apnea    [2] No Known Allergies  [3]   Past Surgical History:  Procedure Laterality Date    COLONOSCOPY      KNEE SURGERY      SHOULDER SURGERY

## 2025-06-04 NOTE — H&P (VIEW-ONLY)
"History of Present Illness:  The patient presents today for evaluation of side: bilateral hand pain.  This is worse on the left.  The patient endorses numbness and tingling predominantly radial three digits.  There is no current neck pain or cervical spine issues.  The patient has tried to the following interventions thus far: Rest, ice, elevation and bracing.  The patient notes the pain is worse with activity  and at night.    He states he had not EMG/nerve conduction study which demonstrated left carpal tunnel syndrome.  I do not have results of these however he was scheduled for left carpal tunnel release with Dr. Melendez.  this was canceled on the day of surgery because he had a\" funny\" feeling in his chest.  He subsequently saw cardiology.  Cardiology thought this was more related to congestion.    The patient denies any recent trauma.  The pain is sharp, electrical in nature.    He now noticed worsening symptoms to his left hand.  He notes nearly chronic numbness but is worse at night.  He notes he is shaking out his hands nightly without any relief.  He would like to pursue left carpal tunnel release.    Medical History[1]    Medication Documentation Review Audit       Reviewed by Katie Bravo LPN (Licensed Nurse) on 06/04/25 at 0950      Medication Order Taking? Sig Documenting Provider Last Dose Status   albuterol (Proventil HFA) 90 mcg/actuation inhaler [id number] No Inhale 2 puffs every 4 hours if needed for wheezing. Historical Provider, MD 6/16/2024 Active   allopurinol (Zyloprim) 300 mg tablet [id number] No Take 1 tablet (300 mg) by mouth once daily. Historical Provider, MD 6/16/2024 Active   amLODIPine (Norvasc) 10 mg tablet [id number] No Take 1 tablet (10 mg) by mouth once daily. Maddie Provider, MD 6/17/2024 Active   atorvastatin (Lipitor) 10 mg tablet [id number] No Take 1 tablet (10 mg) by mouth once daily. Historical Provider, MD 6/16/2024 Active   cholecalciferol (Vitamin D3) 1,250 mcg (50,000 " unit) tablet [id number] No Take 1 tablet (50,000 Units) by mouth 1 (one) time per week. Maddie Jasmine MD Past Week Active   colchicine 0.6 mg tablet [id number] No Take 1 tablet (0.6 mg) by mouth once daily. Maddie Jasmine MD Past Week Active   doxazosin (Cardura) 4 mg tablet [id number] No Take 1 tablet (4 mg) by mouth once daily at bedtime. Maddie Jasmine MD Past Week Active   ferrous sulfate, 325 mg ferrous sulfate, tablet [id number] No Take 1 tablet by mouth once daily. Maddie Jasmine MD Past Week Active   fluocinonide (Lidex) 0.05 % external solution [id number] No Apply 1 Application topically 2 times a day as needed for irritation (scalp itching). Maddie Jasmine MD Past Week Active   folic acid (Folvite) 1 mg tablet [id number] No Take 1 tablet (1 mg) by mouth once daily. Maddie Jasmine MD 6/16/2024 Active   furosemide (Lasix) 40 mg tablet [id number] No Take 1 tablet (40 mg) by mouth once daily. Maddie Jasmine MD 6/16/2024 Active   hydroCHLOROthiazide (Microzide) 12.5 mg capsule [id number] No Take 1 capsule (12.5 mg) by mouth once daily. Maddie Jasmine MD Not Taking Active   ketoconazole (NIZOral) 2 % shampoo [id number] No Apply 1 Application topically 2 times a week. Maddie Jasmine MD Past Week Active   linaCLOtide (Linzess) 145 mcg capsule [id number] No Take 1 capsule (145 mcg) by mouth once daily in the morning. Take before meals. Do not crush or chew. Maddie Jasmine MD Past Week Active   lisinopril 40 mg tablet [id number] No Take 1 tablet (40 mg) by mouth once daily. Maddie Jasmine MD Past Week Active   metoprolol tartrate (Lopressor) 25 mg tablet [id number] No Take 1 tablet (25 mg) by mouth once daily. Maddie Jasmine MD 6/16/2024 Active   pantoprazole (ProtoNix) 40 mg EC tablet [id number] No Take 1 tablet (40 mg) by mouth once daily in the morning. Take before meals. Do not crush, chew, or split. Maddie Jasmine MD 6/16/2024 Active   potassium  chloride CR 20 mEq ER tablet [id number] No Take 1 tablet (20 mEq) by mouth once daily. Do not crush or chew. Historical Provider, MD 2024 Active   predniSONE (Deltasone) 5 mg tablet [id number] No Take 1 tablet (5 mg) by mouth once daily. Historical Provider, MD 2024 Active   tiotropium (Spiriva Respimat) 2.5 mcg/actuation inhaler [id number] No Inhale 2 puffs once daily. Historical Provider, MD Past Week Active   traMADol (Ultram) 50 mg tablet [id number] No Take 1 tablet (50 mg) by mouth every 6 hours if needed for severe pain (7 - 10). Historical Provider, MD 2024 Active   ursodiol (Actigall) 300 mg capsule [id number] No Take 1 capsule (300 mg) by mouth 2 times a day. Historical Provider, MD 2024 Active                    RX Allergies[2]    Social History     Socioeconomic History    Marital status: Single     Spouse name: Not on file    Number of children: Not on file    Years of education: Not on file    Highest education level: Not on file   Occupational History    Not on file   Tobacco Use    Smoking status: Former     Current packs/day: 0.00     Types: Cigarettes     Quit date: 2016     Years since quittin.4    Smokeless tobacco: Never   Vaping Use    Vaping status: Never Used   Substance and Sexual Activity    Alcohol use: Yes     Alcohol/week: 14.0 standard drinks of alcohol     Types: 14 Cans of beer per week    Drug use: Never    Sexual activity: Not on file   Other Topics Concern    Not on file   Social History Narrative    Not on file     Social Drivers of Health     Financial Resource Strain: Not on file   Food Insecurity: Not on file   Transportation Needs: Not on file   Physical Activity: Not on file   Stress: Not on file   Social Connections: Not on file   Intimate Partner Violence: Not on file   Housing Stability: Not on file       Surgical History[3]       Yes History of diabetes or hypothyroidism.     Review of Systems   GENERAL: Negative for malaise, significant weight loss,  fever  MUSCULOSKELETAL: see HPI  NEURO:  Negative     Physical Examination:  No tenderness to palpation cervical spine  Good ROM of neck  Negative Spurlings test     side: left wrist/hand:  No obvious swelling or masses  Thenar eminence muscle mass: Mild atrophy  No atrophy noted of hypothenar eminence   Equivocal Tinel's at carpal tunnel  + Median nerve compression test  + Phalen's  Decreased sensation to light touch in median nerve distribution  Motor exam within normal limits  Radial pulse palpable  Good capillary refill     EMG: Per patient positive for carpal tunnel syndrome    Additional studies: None     Assessment:  Patient with side: left carpal tunnel syndrome     Plan:  We reviewed the disease process with the patient.  We discussed the role for electrodiagnostic testing and treatment decision making, immobilization, and injections.  We discussed surgical intervention reviewing the risks (neurologic injury, wound issues including infection, pillar pain, and recurrence) and benefits.  The patient elected for: Carpal Tunnel Release Surgery.    For Surgical Planning:  Diagnosis: Left carpal tunnel syndrome  Procedure: Left carpal tunnel release  CPT: 66064  Anesthesia: MAC  Duration: 30 minutes  Special Equipment Needed: None  [facility]  Initial Post Operative Visit: 2 weeks                [1]   Past Medical History:  Diagnosis Date    Arthritis     Asthma     BMI 39.0-39.9,adult     BPH (benign prostatic hyperplasia)     Carpal tunnel syndrome     CHF (congestive heart failure)     Chronic pain disorder     COPD (chronic obstructive pulmonary disease) (Multi)     Disease of thyroid gland     Hyperlipidemia     Hypertension     Rheumatoid arthritis     Sleep apnea    [2] No Known Allergies  [3]   Past Surgical History:  Procedure Laterality Date    COLONOSCOPY      KNEE SURGERY      SHOULDER SURGERY

## 2025-06-12 DIAGNOSIS — G56.02 CARPAL TUNNEL SYNDROME ON LEFT: ICD-10-CM

## 2025-06-12 DIAGNOSIS — M79.642 LEFT HAND PAIN: ICD-10-CM

## 2025-06-17 RX ORDER — BUDESONIDE, GLYCOPYRROLATE, AND FORMOTEROL FUMARATE 160; 9; 4.8 UG/1; UG/1; UG/1
2 AEROSOL, METERED RESPIRATORY (INHALATION)
COMMUNITY
Start: 2025-06-10

## 2025-06-17 RX ORDER — FLUTICASONE PROPIONATE 50 MCG
1 SPRAY, SUSPENSION (ML) NASAL DAILY
COMMUNITY
Start: 2025-02-28

## 2025-06-24 ENCOUNTER — ANESTHESIA EVENT (OUTPATIENT)
Dept: OPERATING ROOM | Facility: CLINIC | Age: 75
End: 2025-06-24
Payer: COMMERCIAL

## 2025-06-25 ENCOUNTER — HOSPITAL ENCOUNTER (OUTPATIENT)
Facility: CLINIC | Age: 75
Setting detail: OUTPATIENT SURGERY
Discharge: HOME | End: 2025-06-25
Attending: STUDENT IN AN ORGANIZED HEALTH CARE EDUCATION/TRAINING PROGRAM | Admitting: STUDENT IN AN ORGANIZED HEALTH CARE EDUCATION/TRAINING PROGRAM
Payer: COMMERCIAL

## 2025-06-25 ENCOUNTER — ANESTHESIA (OUTPATIENT)
Dept: OPERATING ROOM | Facility: CLINIC | Age: 75
End: 2025-06-25
Payer: COMMERCIAL

## 2025-06-25 VITALS
OXYGEN SATURATION: 96 % | DIASTOLIC BLOOD PRESSURE: 57 MMHG | RESPIRATION RATE: 16 BRPM | SYSTOLIC BLOOD PRESSURE: 101 MMHG | BODY MASS INDEX: 36.79 KG/M2 | HEART RATE: 74 BPM | HEIGHT: 72 IN | WEIGHT: 271.61 LBS | TEMPERATURE: 97 F

## 2025-06-25 DIAGNOSIS — G56.02 CARPAL TUNNEL SYNDROME ON LEFT: Primary | ICD-10-CM

## 2025-06-25 DIAGNOSIS — M79.642 LEFT HAND PAIN: ICD-10-CM

## 2025-06-25 PROBLEM — E78.5 HYPERLIPIDEMIA: Status: ACTIVE | Noted: 2025-06-25

## 2025-06-25 PROBLEM — E66.9 OBESITY: Status: ACTIVE | Noted: 2025-06-25

## 2025-06-25 PROBLEM — I10 PRIMARY HYPERTENSION: Status: ACTIVE | Noted: 2025-06-25

## 2025-06-25 PROBLEM — I25.10 CAD (CORONARY ARTERY DISEASE): Status: ACTIVE | Noted: 2025-06-25

## 2025-06-25 PROBLEM — J45.909 ASTHMA: Status: ACTIVE | Noted: 2025-06-25

## 2025-06-25 PROCEDURE — 99100 ANES PT EXTEME AGE<1 YR&>70: CPT | Performed by: ANESTHESIOLOGY

## 2025-06-25 PROCEDURE — A64721 PR REVISE MEDIAN N/CARPAL TUNNEL SURG: Performed by: NURSE ANESTHETIST, CERTIFIED REGISTERED

## 2025-06-25 PROCEDURE — 3600000003 HC OR TIME - INITIAL BASE CHARGE - PROCEDURE LEVEL THREE: Performed by: STUDENT IN AN ORGANIZED HEALTH CARE EDUCATION/TRAINING PROGRAM

## 2025-06-25 PROCEDURE — A64721 PR REVISE MEDIAN N/CARPAL TUNNEL SURG: Performed by: ANESTHESIOLOGY

## 2025-06-25 PROCEDURE — 96372 THER/PROPH/DIAG INJ SC/IM: CPT

## 2025-06-25 PROCEDURE — 64721 CARPAL TUNNEL SURGERY: CPT | Performed by: STUDENT IN AN ORGANIZED HEALTH CARE EDUCATION/TRAINING PROGRAM

## 2025-06-25 PROCEDURE — 3700000002 HC GENERAL ANESTHESIA TIME - EACH INCREMENTAL 1 MINUTE: Performed by: STUDENT IN AN ORGANIZED HEALTH CARE EDUCATION/TRAINING PROGRAM

## 2025-06-25 PROCEDURE — 2500000004 HC RX 250 GENERAL PHARMACY W/ HCPCS (ALT 636 FOR OP/ED): Mod: JW,TB

## 2025-06-25 PROCEDURE — 7100000009 HC PHASE TWO TIME - INITIAL BASE CHARGE: Performed by: STUDENT IN AN ORGANIZED HEALTH CARE EDUCATION/TRAINING PROGRAM

## 2025-06-25 PROCEDURE — 7100000010 HC PHASE TWO TIME - EACH INCREMENTAL 1 MINUTE: Performed by: STUDENT IN AN ORGANIZED HEALTH CARE EDUCATION/TRAINING PROGRAM

## 2025-06-25 PROCEDURE — 2500000005 HC RX 250 GENERAL PHARMACY W/O HCPCS: Performed by: STUDENT IN AN ORGANIZED HEALTH CARE EDUCATION/TRAINING PROGRAM

## 2025-06-25 PROCEDURE — 3700000001 HC GENERAL ANESTHESIA TIME - INITIAL BASE CHARGE: Performed by: STUDENT IN AN ORGANIZED HEALTH CARE EDUCATION/TRAINING PROGRAM

## 2025-06-25 PROCEDURE — 2500000004 HC RX 250 GENERAL PHARMACY W/ HCPCS (ALT 636 FOR OP/ED): Performed by: STUDENT IN AN ORGANIZED HEALTH CARE EDUCATION/TRAINING PROGRAM

## 2025-06-25 PROCEDURE — 2500000004 HC RX 250 GENERAL PHARMACY W/ HCPCS (ALT 636 FOR OP/ED): Mod: JW

## 2025-06-25 PROCEDURE — 3600000008 HC OR TIME - EACH INCREMENTAL 1 MINUTE - PROCEDURE LEVEL THREE: Performed by: STUDENT IN AN ORGANIZED HEALTH CARE EDUCATION/TRAINING PROGRAM

## 2025-06-25 RX ORDER — KETOROLAC TROMETHAMINE 30 MG/ML
INJECTION, SOLUTION INTRAMUSCULAR; INTRAVENOUS AS NEEDED
Status: DISCONTINUED | OUTPATIENT
Start: 2025-06-25 | End: 2025-06-25

## 2025-06-25 RX ORDER — SODIUM CHLORIDE, SODIUM LACTATE, POTASSIUM CHLORIDE, CALCIUM CHLORIDE 600; 310; 30; 20 MG/100ML; MG/100ML; MG/100ML; MG/100ML
100 INJECTION, SOLUTION INTRAVENOUS CONTINUOUS
Status: DISCONTINUED | OUTPATIENT
Start: 2025-06-25 | End: 2025-06-25 | Stop reason: HOSPADM

## 2025-06-25 RX ORDER — ACETAMINOPHEN 325 MG/1
650 TABLET ORAL EVERY 4 HOURS PRN
Status: DISCONTINUED | OUTPATIENT
Start: 2025-06-25 | End: 2025-06-25 | Stop reason: HOSPADM

## 2025-06-25 RX ORDER — OXYCODONE HYDROCHLORIDE 5 MG/1
5 TABLET ORAL EVERY 4 HOURS PRN
Status: DISCONTINUED | OUTPATIENT
Start: 2025-06-25 | End: 2025-06-25 | Stop reason: HOSPADM

## 2025-06-25 RX ORDER — LIDOCAINE HYDROCHLORIDE AND EPINEPHRINE 10; 10 UG/ML; MG/ML
INJECTION, SOLUTION INFILTRATION; PERINEURAL AS NEEDED
Status: DISCONTINUED | OUTPATIENT
Start: 2025-06-25 | End: 2025-06-25 | Stop reason: HOSPADM

## 2025-06-25 RX ORDER — HYDROCODONE BITARTRATE AND ACETAMINOPHEN 5; 325 MG/1; MG/1
1 TABLET ORAL EVERY 6 HOURS PRN
Qty: 10 TABLET | Refills: 0 | Status: SHIPPED | OUTPATIENT
Start: 2025-06-25 | End: 2025-06-28 | Stop reason: ALTCHOICE

## 2025-06-25 RX ORDER — CEFAZOLIN 1 G/1
INJECTION, POWDER, FOR SOLUTION INTRAVENOUS AS NEEDED
Status: DISCONTINUED | OUTPATIENT
Start: 2025-06-25 | End: 2025-06-25

## 2025-06-25 RX ORDER — LIDOCAINE HYDROCHLORIDE 10 MG/ML
0.1 INJECTION, SOLUTION EPIDURAL; INFILTRATION; INTRACAUDAL; PERINEURAL ONCE
Status: DISCONTINUED | OUTPATIENT
Start: 2025-06-25 | End: 2025-06-25 | Stop reason: HOSPADM

## 2025-06-25 RX ORDER — PROPOFOL 10 MG/ML
INJECTION, EMULSION INTRAVENOUS AS NEEDED
Status: DISCONTINUED | OUTPATIENT
Start: 2025-06-25 | End: 2025-06-25

## 2025-06-25 RX ORDER — ONDANSETRON HYDROCHLORIDE 2 MG/ML
4 INJECTION, SOLUTION INTRAVENOUS ONCE AS NEEDED
Status: DISCONTINUED | OUTPATIENT
Start: 2025-06-25 | End: 2025-06-25 | Stop reason: HOSPADM

## 2025-06-25 RX ORDER — ONDANSETRON HYDROCHLORIDE 2 MG/ML
INJECTION, SOLUTION INTRAVENOUS AS NEEDED
Status: DISCONTINUED | OUTPATIENT
Start: 2025-06-25 | End: 2025-06-25

## 2025-06-25 RX ORDER — SODIUM CHLORIDE 0.9 G/100ML
INJECTION, SOLUTION IRRIGATION AS NEEDED
Status: DISCONTINUED | OUTPATIENT
Start: 2025-06-25 | End: 2025-06-25 | Stop reason: HOSPADM

## 2025-06-25 RX ADMIN — PROPOFOL 160 MCG/KG/MIN: 10 INJECTION, EMULSION INTRAVENOUS at 08:17

## 2025-06-25 RX ADMIN — PROPOFOL 20 MG: 10 INJECTION, EMULSION INTRAVENOUS at 08:20

## 2025-06-25 RX ADMIN — SODIUM CHLORIDE: 9 INJECTION, SOLUTION INTRAVENOUS at 08:12

## 2025-06-25 RX ADMIN — KETOROLAC TROMETHAMINE 15 MG: 30 INJECTION, SOLUTION INTRAMUSCULAR; INTRAVENOUS at 08:40

## 2025-06-25 RX ADMIN — PROPOFOL 20 MG: 10 INJECTION, EMULSION INTRAVENOUS at 08:18

## 2025-06-25 RX ADMIN — CEFAZOLIN 3 G: 330 INJECTION, POWDER, FOR SOLUTION INTRAMUSCULAR; INTRAVENOUS at 08:24

## 2025-06-25 RX ADMIN — ONDANSETRON 4 MG: 2 INJECTION, SOLUTION INTRAMUSCULAR; INTRAVENOUS at 08:33

## 2025-06-25 RX ADMIN — PROPOFOL 20 MG: 10 INJECTION, EMULSION INTRAVENOUS at 08:22

## 2025-06-25 RX ADMIN — PROPOFOL 20 MG: 10 INJECTION, EMULSION INTRAVENOUS at 08:19

## 2025-06-25 SDOH — HEALTH STABILITY: MENTAL HEALTH: CURRENT SMOKER: 0

## 2025-06-25 ASSESSMENT — COLUMBIA-SUICIDE SEVERITY RATING SCALE - C-SSRS
2. HAVE YOU ACTUALLY HAD ANY THOUGHTS OF KILLING YOURSELF?: NO
6. HAVE YOU EVER DONE ANYTHING, STARTED TO DO ANYTHING, OR PREPARED TO DO ANYTHING TO END YOUR LIFE?: NO
1. IN THE PAST MONTH, HAVE YOU WISHED YOU WERE DEAD OR WISHED YOU COULD GO TO SLEEP AND NOT WAKE UP?: NO

## 2025-06-25 ASSESSMENT — PAIN SCALES - GENERAL
PAIN_LEVEL: 0
PAINLEVEL_OUTOF10: 3
PAINLEVEL_OUTOF10: 0 - NO PAIN

## 2025-06-25 ASSESSMENT — PAIN - FUNCTIONAL ASSESSMENT
PAIN_FUNCTIONAL_ASSESSMENT: 0-10
PAIN_FUNCTIONAL_ASSESSMENT: 0-10

## 2025-06-25 NOTE — OP NOTE
LEFT CARPAL TUNNEL REALEASE / 30 MINUTES (L) Operative Note     Date: 2025  OR Location: [facility]    Name: Dmitriy Hobbs : 1950, Age: 74 y.o., MRN: [id number], Sex: male    Diagnosis  Pre-op Diagnosis      * Left hand pain [M79.642]     * Carpal tunnel syndrome on left [G56.02] Post-op Diagnosis     * Left hand pain [M79.642]     * Carpal tunnel syndrome on left [G56.02]     Procedures  LEFT CARPAL TUNNEL REALEASE / 30 MINUTES  36976 - CA NEUROPLASTY &/TRANSPOS MEDIAN NRV CARPAL TUNNE      Surgeons      * Will B Beucler - Primary    Resident/Fellow/Other Assistant:  Surgeons and Role:  * No surgeons found with a matching role *    Staff:   Zenaidaulator: Sarahi Gutierrez Person: Koki    Anesthesia Staff: Anesthesiologist: Rosendo Feliciano MD  CRNA: JOSEPH Malhotra  SRNA: Oly Murillo    Procedure Summary  Anesthesia: Monitor Anesthesia Care  ASA: III  Estimated Blood Loss: 2mL  Intra-op Medications: Administrations occurring from 0858 to 0958 on 25:  * No intraprocedure medications in log *           Anesthesia Record               Intraprocedure I/O Totals          Intake    NaCl 0.9 % bolus 200.00 mL    Total Intake 200 mL       Output    Est. Blood Loss 2 mL    Total Output 2 mL       Net    Net Volume 198 mL          Specimen: No specimens collected              Drains and/or Catheters: * None in log *    Tourniquet Times:     Total Tourniquet Time Documented:  Arm - Upper (Left) - 9 minutes  Total: Arm - Upper (Left) - 9 minutes      Implants:     Findings: Thickened transverse carpal ligament    Indications: Dmitriy Hobbs is an 74 y.o. male who is having surgery for Left hand pain [M79.642]  Carpal tunnel syndrome on left [G56.02].     The patient was seen in the preoperative area. The risks, benefits, complications, treatment options, non-operative alternatives, expected recovery and outcomes were discussed with the patient. The possibilities of reaction to  medication, pulmonary aspiration, injury to surrounding structures, bleeding, recurrent infection, the need for additional procedures, failure to diagnose a condition, and creating a complication requiring transfusion or operation were discussed with the patient. The patient concurred with the proposed plan, giving informed consent.  The site of surgery was properly noted/marked if necessary per policy. The patient has been actively warmed in preoperative area. Preoperative antibiotics have been ordered and given within 1 hours of incision. Venous thrombosis prophylaxis are not indicated.    Procedure Details: Indications for procedure:     I met and evaluated the patient in the preoperative holding area today prior to the patient receiving any sedation or other medications which may alter their mental status. I confirmed their identity via the facility's protocol. I reviewed the patient's history, physical exam, and recommendation for surgery. The indications for surgical versus nonsurgical management of the condition were discussed, including the inherent risks, benefits, prognosis of the condition.  The risks of surgery include, but are not limited to, pain, bleeding, infection, tendon damage, nerve damage, vessel damage, and need for further surgery.  The risks also include wound healing problems, decreased long-term functionality of the wrist and hand, tendon irritation, tendon rupture, and possible need for therapy for an optimum outcome.  There is a risk of complex regional pain syndrome, incomplete recovery, incomplete relief or worsening of symptoms, and recurrence.  We also discussed that medical complications are possible during and after surgery related to either anesthesia or the surgical procedure itself. Specific discussion of the risks of the proposed anesthetic plan will be discussed by the patient's anesthesia provider. All risks were reviewed in layman´s terms. The patient was given ample time to  ask appropriate questions and appeared to be satisfied with the answers provided. All questions were answered and no guarantees have been given regarding the patient's condition and treatment recommendations. The general plan for the postoperative rehabilitation course, including possible need for therapy, was reviewed at great length. Informed consent was signed by all appropriate parties. The surgical site was marked in ink by myself.        Procedure in Detail:  The patient was transported to the operating room and placed in the supine position on the operating table. All extremities and prominences were appropriately padded. Adequate anesthesia was induced. A non-sterile tourniquet was applied high on the left arm. The left arm was prepped and draped in standard sterile fashion. A time-out was conducted prior to beginning the operation to confirm the patient's identity, planned procedures, laterality of procedures, and that appropriate antibiotics had been administered within 30 minutes of incision. The [ ] upper extremity was exsanguinated with an Esmarch bandage, and the tourniquet was inflated to 250mmHg.     10 cc of local anesthetic was injected over the planned surgical site.  This was 1% lidocaine with 1:100,000 of epinephrine.    The patient was transported to the procedure room and placed in the supine position on the operating table. All extremities and prominences were appropriately padded.   A non-sterile tourniquet was applied high on the left arm. The left upper extremity was prepped and draped in standard sterile fashion. A time-out was conducted prior to beginning the operation to confirm the patient's identity, planned procedures, and laterality of procedures.  The left upper extremity was exsanguinated with an Esmarch bandage, and the tourniquet was inflated to 250 mmHg.     A longitudinal incision was made in the proximal palm in line with the radial border of the 4th ray beginning just distal  to the distal wrist flexion crease with a total incision length of approximately 3 cm.  A full-thickness skin incision was made.  Sharp dissection was used to identify the superficial palmar fascia which was incised longitudinally over the length of the incision.  The transverse carpal ligament was identified.  The transverse carpal ligament was then incised longitudinally taking great care to keep the scalpel perpendicular to the plane of the transverse carpal ligament.  The carpal tunnel was thus entered.  The distal limb of the transverse carpal ligament was then incised until the distal perivascular fat pad had been reached. Great care was taken at this point to ensure that there were no other constricting fibers overlying the carpal tunnel at this level.     Attention was turned proximally.  A plane superficial and deep to the proximal limb of the transverse carpal ligament was then made. The proximal limb was then incised under direct visualization longitudinally along with its continuity with the more distal portion of the volar antebrachial fascia to a point approximately 3 cm proximal to the distal wrist flexion crease.     A complete carpal tunnel release was confirmed both visibly and palpably by wide, gapping leaflets radially and ulnarly.  There was no significant hypertrophic tenosynovitis present about the carpal tunnel contents.      The wound was copiously irrigated with sterile saline. The wound was closed with 4-0 nylon suture in horizontal mattress fashion.  The wound was dressed with sterile dressings consisting of Xeroform, 4x4 gauze, cast padding, and an ace bandage     The patient was transferred to the hospital bed and transported to the post-anesthesia care unit in good condition having tolerated the procedure well. All sponge, needle, and instrument counts were correct x2. Postoperatively, the digits were pink and well perfused and the hand compartments and soft tissues were supple. No  complications were apparent.    Postoperative Plan:  The patient will be 2 pound weightbearing on their operative site. Their dressing will be removed 5 days.   They will return to clinic in 2 weeks. X-rays not needed on return visit.  Evidence of Infection: No   Complications:  None; patient tolerated the procedure well.    Disposition: PACU - hemodynamically stable.  Condition: stable         Additional Details: See op note    Attending Attestation: I performed the procedure.    Will B Beucler  Phone Number: [phone]

## 2025-06-25 NOTE — DISCHARGE INSTRUCTIONS
Post-Operative Instructions   Dr. Arturo Hernandezucler   [phone]     Dressing: You have a bandage or splint covering your operative site:    You may remove the dressing in 5 days following surgery. Once your dressing is removed you may shower and / or wash your incision in a sink with soap and water. Do not soak your incision.   No bath tubs, hot tubs or swimming pools. After washing the wound please pat the incision dry thoroughly.   Please use mild soap. Please do not use hydrogen peroxide.   Please cover the wound with a band-aid or gauze and change it daily. Please do not apply any salves, cream, lotions or ointments to the surgical incision.   Otherwise keep incision clean and dry (no yard work, engine work, etc.)    Showering: You may shower following surgery but please adhere to above instructions regarding the dressing. If showering with bandage / splint in place please ensure that it is kept dry and covered while bathing. There are commercially available cast bags that can be used to protect your dressing while showering. If using garbage bags please make sure that there are no holes in the bag and that the bag has been sealed above the dressing. If the bandage gets wet you must contact your surgeon's office to make arrangements to be seen to have bandage changed.    Ice / Elevation: The application of ice to your surgical site after surgery will help with pain control and swelling. This can be very effective for 48-72 hours after surgery. Please be careful to avoid getting bandage wet from a leaky ice bag. Please be advised that the ice may need to be applied for longer periods of time for the cooling effect to penetrate the post-operative dressing. Elevation of the operative site above the level of the heart as much as possible for the first 48-72 hours after surgery. Use your sling if you have been provided one while standing or walking. If your fingers are not included in the dressing you are encouraged  to attempt finger range of motion as this will help with your hand swelling and ultimate recovery.     Pain Medication: If you received a regional anesthetic on the day of your surgery your arm and hand may be numb for up to 24 hours after surgery. It is important to wear your sling while the block is still effective in order to protect your arm. It is advisable to take pain medications prior to going to sleep in case the regional anesthesia medication wears off while you are sleeping. Take your pain medications as directed. Narcotic pain medications can cause lethargy, nausea and constipation. Please contact your surgeon's office and discontinue the medication if these symptoms  become problematic. Eating a regular diet, drinking fluids and walking can  help with constipation from these medications.   Avoid alcohol consumption     Additional pain control options:  You are encouraged to take over the counter medications like Advil / Motrin / Ibuprofen / Aleve in addition to your prescribed pain medications after surgery       Smoking / Tobacco: Tobacco use is known to interfere with wound and fracture healing and increase post-operative pain. It can also increase your risk of poor outcomes following surgery. Please do not use tobacco or nicotine products following your surgery. This includes smokeless tobacco, or nicotine replacement products (patches, gum, etc.)     Driving: It is not advisable to operate a vehicle while using narcotic pain medications. Additionally, please be advised that you are likely to have challenges operating a car or motorcycle while you are still in your postoperative dressing and this could increase your risk of being involved in an accident and being cited for driving while physically impaired.     Warning Signs: Observe your arm / hand and incision site (if visible) for increased redness, inflammation, drainage, odor or pain that is unrelieved by rest, elevation or medication. Please  contact your surgeon's office immediately if you develop any of these issues or if you develop a fever greater than 101°.     Follow Up Appointments: Your post-operative appointment has been scheduled for   You do not yet have a post-operative appointment scheduled. Please contact Dr. De La Torre's office at [phone] to schedule this appointment.

## 2025-06-25 NOTE — INTERVAL H&P NOTE
H&P reviewed. The patient was examined and there are no changes to the H&P.    Patient seen evaluate in the preoperative holding area.  Discussed plan for left carpal tunnel release.  Okay to proceed with surgery    The indications for surgical versus nonsurgical management of the condition have been advised, including the inherent risks, benefits, prognosis of the condition.  The risks of surgery include, but are not limited to, pain, bleeding, blood clots, infection, tendon damage, nerve damage, vessel damage, and need for further surgery.  The risks also include wound healing problems, decreased long-term functionality of the wrist and hand, tendon irritation, tendon rupture, and possible need for therapy for an optimum outcome.  There is a risk of complex regional pain syndrome, incomplete recovery, incomplete relief or worsening of symptoms, and recurrence. We also discussed that medical complications are possible during and after surgery related to either anesthesia or the surgical procedure itself. Specific discussion of the risks of the proposed anesthetic plan will be discussed by the patient's anesthesia provider. All risks were reviewed in layman´s terms. The general plan for the postoperative rehabilitation course, including possible need for therapy, was reviewed at great length. The patient was given ample time to ask appropriate questions and appeared to be satisfied with the answers provided. All questions were answered and no guarantees have been given regarding the patient's condition and treatment recommendations.

## 2025-06-25 NOTE — ANESTHESIA PREPROCEDURE EVALUATION
Patient: Dmitriy Hobbs    Procedure Information       Date/Time: 25 0858    Procedure: LEFT CARPAL TUNNEL REALEASE / 30 MINUTES (Left: Wrist)    Location: [facility] OR 03 /  [facility] OR    Surgeons: Will B Beucler, DO            Relevant Problems   Anesthesia (within normal limits)      Cardiac   (+) CAD (coronary artery disease)   (+) Hyperlipidemia   (+) Primary hypertension      Pulmonary   (+) Asthma      Neuro   (+) Carpal tunnel syndrome on left   (+) Left carpal tunnel syndrome      Endocrine   (+) Obesity      Musculoskeletal   (+) Carpal tunnel syndrome on left   (+) Left carpal tunnel syndrome       Clinical information reviewed:   Tobacco  Allergies  Meds   Med Hx  Surg Hx   Fam Hx  Soc Hx        NPO Detail:  No data recorded     Physical Exam    Airway  Mallampati: II  TM distance: >3 FB  Mouth openin finger widths     Cardiovascular - normal exam   Dental        Pulmonary - normal exam   Abdominal - normal exam           Anesthesia Plan    History of general anesthesia?: yes  History of complications of general anesthesia?: no    ASA 3     MAC     The patient is not a current smoker.    intravenous induction   Anesthetic plan and risks discussed with patient.    Plan discussed with CAA and CRNA.

## 2025-06-25 NOTE — ANESTHESIA POSTPROCEDURE EVALUATION
Patient: mDitriy Hobbs    Procedure Summary       Date: 06/25/25 Room / Location: [facility] OR 03 / [facility]    Anesthesia Start: 0812 Anesthesia Stop: 0851    Procedure: LEFT CARPAL TUNNEL REALEASE / 30 MINUTES (Left: Wrist) Diagnosis:       Left hand pain      Carpal tunnel syndrome on left      (Left hand pain [M79.642])      (Carpal tunnel syndrome on left [G56.02])    Surgeons: Arturo De La Torre DO Responsible Provider: Rosendo Feliciano MD    Anesthesia Type: MAC ASA Status: 3            Anesthesia Type: MAC    Vitals Value Taken Time   /57 06/25/25 09:23   Temp 36.1 °C (97 °F) 06/25/25 09:23   Pulse 74 06/25/25 09:23   Resp 16 06/25/25 09:23   SpO2 96 % 06/25/25 09:23       Anesthesia Post Evaluation    Patient location during evaluation: bedside  Patient participation: complete - patient participated  Level of consciousness: awake and alert  Pain score: 0  Pain management: adequate  Multimodal analgesia pain management approach  Airway patency: patent  Two or more strategies used to mitigate risk of obstructive sleep apnea  Cardiovascular status: acceptable  Respiratory status: acceptable and room air  Hydration status: acceptable  Postoperative Nausea and Vomiting: none        There were no known notable events for this encounter.

## 2025-06-28 DIAGNOSIS — G89.18 POST-OP PAIN: Primary | ICD-10-CM

## 2025-06-28 RX ORDER — HYDROCODONE BITARTRATE AND ACETAMINOPHEN 5; 325 MG/1; MG/1
1 TABLET ORAL EVERY 6 HOURS PRN
Qty: 20 TABLET | Refills: 0 | Status: SHIPPED | OUTPATIENT
Start: 2025-06-28

## 2025-07-07 ENCOUNTER — TELEPHONE (OUTPATIENT)
Dept: ORTHOPEDIC SURGERY | Facility: HOSPITAL | Age: 75
End: 2025-07-07
Payer: COMMERCIAL

## 2025-07-07 NOTE — TELEPHONE ENCOUNTER
Copied from CRM #[id number]. Topic: Information Request - Trying to reach PCP  >> Jul 1, 2025 12:49 PM Phuong DUEÑAS wrote:  Hello,  Patient trying to reach their PCP.  Pt had surgery 6/25/25 with provider and needed a post op visit. Pt would like a call back about stitches that are in his left hand.   Please follow up. Thank you

## 2025-07-08 ENCOUNTER — APPOINTMENT (OUTPATIENT)
Dept: ORTHOPEDIC SURGERY | Facility: HOSPITAL | Age: 75
End: 2025-07-08
Payer: COMMERCIAL

## 2025-07-10 ENCOUNTER — HOSPITAL ENCOUNTER (EMERGENCY)
Facility: HOSPITAL | Age: 75
Discharge: HOME | End: 2025-07-10
Payer: COMMERCIAL

## 2025-07-10 VITALS
RESPIRATION RATE: 16 BRPM | DIASTOLIC BLOOD PRESSURE: 78 MMHG | BODY MASS INDEX: 36.57 KG/M2 | WEIGHT: 270 LBS | SYSTOLIC BLOOD PRESSURE: 146 MMHG | OXYGEN SATURATION: 94 % | HEART RATE: 89 BPM | HEIGHT: 72 IN | TEMPERATURE: 98.4 F

## 2025-07-10 DIAGNOSIS — G89.18 POSTOPERATIVE PAIN: Primary | ICD-10-CM

## 2025-07-10 PROCEDURE — 99284 EMERGENCY DEPT VISIT MOD MDM: CPT

## 2025-07-10 PROCEDURE — 99221 1ST HOSP IP/OBS SF/LOW 40: CPT | Performed by: STUDENT IN AN ORGANIZED HEALTH CARE EDUCATION/TRAINING PROGRAM

## 2025-07-10 RX ORDER — HYDROCODONE BITARTRATE AND ACETAMINOPHEN 5; 325 MG/1; MG/1
1 TABLET ORAL EVERY 6 HOURS PRN
Qty: 20 TABLET | Refills: 0 | Status: SHIPPED | OUTPATIENT
Start: 2025-07-10 | End: 2025-07-15

## 2025-07-10 ASSESSMENT — ENCOUNTER SYMPTOMS
HEADACHES: 0
ABDOMINAL PAIN: 0
WOUND: 1
FEVER: 0
CONFUSION: 0
DIFFICULTY URINATING: 0
SHORTNESS OF BREATH: 0

## 2025-07-10 ASSESSMENT — PAIN - FUNCTIONAL ASSESSMENT: PAIN_FUNCTIONAL_ASSESSMENT: 0-10

## 2025-07-10 ASSESSMENT — PAIN SCALES - GENERAL: PAINLEVEL_OUTOF10: 4

## 2025-07-10 NOTE — CONSULTS
Reason For Consult  Left carpal tunnel release wound check    History Of Present Illness  Dmitriy Hobbs is a 74 y.o. male presenting with concerns for wound check s/p L carpal tunnel release on 6/25 with Dr. De La Torre. He has been complaining of L wrist pain and swelling the past 3-4 days. The pain is constant, moderate in severity and describes a throbbing sensation. He states that he is almost out of pain meds. Pt has associated numbness of all the digits. Of note, patient has been doing daily dressing changes and keeping the wound clean.      Past Medical History  He has a past medical history of Anemia, Arthritis, Asthma, BMI 39.0-39.9,adult, BPH (benign prostatic hyperplasia), Carpal tunnel syndrome, CHF (congestive heart failure), Chronic pain disorder, COPD (chronic obstructive pulmonary disease) (Multi), Disease of thyroid gland, GERD (gastroesophageal reflux disease), Gout, Hyperlipidemia, Hypertension, Irritable bowel syndrome, Rheumatoid arthritis, and Sleep apnea.    Surgical History  He has a past surgical history that includes Colonoscopy; Shoulder surgery; Knee Arthroplasty (Bilateral); Other surgical history (Right); Other surgical history (Left); and Upper gastrointestinal endoscopy.     Social History  He reports that he quit smoking about 9 years ago. His smoking use included cigarettes. He has never used smokeless tobacco. He reports current alcohol use of about 49.0 standard drinks of alcohol per week. He reports that he does not use drugs.    Family History  No pertinent Fhx    Allergies  Pravastatin    Review of Systems  Review of Systems   Constitutional:  Negative for fever.   HENT:  Negative for congestion.    Eyes:  Negative for visual disturbance.   Respiratory:  Negative for shortness of breath.    Cardiovascular:  Negative for chest pain.   Gastrointestinal:  Negative for abdominal pain.   Genitourinary:  Negative for difficulty urinating.   Skin:  Positive for wound (incision site with  sutures present of L hand). Negative for rash.   Neurological:  Negative for headaches.   Psychiatric/Behavioral:  Negative for confusion.      Physical Exam  - Constitutional: No acute distress, cooperative  - Eyes: EOM grossly intact  - Head/Neck: Trachea midline  - Respiratory/Thorax: NWOB  - Cardiovascular: RRR on peripheral palpation  - Gastrointestinal: Nondistended  - Psychological: Appropriate mood/behavior  - Skin: Warm and dry. Additional findings in musculoskeletal evaluation  - Musculoskeletal: See below for additional details  LUE:  - Moderate swelling of entire wrist and hand. No warmth or erythema.   - Superficial wound dehiscence in the central area of incision site. No discharge.  - TTP: Mild TTP at surgical site  - Motor: +AIN/PIN/ulnar   - SILT: reported paresthesias of all digits otherwise r/u/m distr  - ROM: Able to flex and extend at wrist and all digits  - Palpable radial pulse, brisk cap refill  - Forearm soft/compressible     Last Recorded Vitals  Blood pressure 146/78, pulse 89, temperature 36.9 °C (98.4 °F), resp. rate 16, height 1.829 m (6'), weight 122 kg (270 lb), SpO2 94%.    Relevant Results  No relevant results for this visit.     Scheduled medications  Scheduled Medications[1]  Continuous medications  Continuous Medications[2]  PRN medications  PRN Medications[3]  No results found for this or any previous visit (from the past 24 hours).     Assessment/Plan     Assessment: 73 y/o RHD M with PSHx L carpal tunnel release on 6/25/2025 with Dr. De La Torre presenting for wound check. Physical exam remarkable for moderate swelling of entire wrist and hand. No warmth or erythema. No discharge from incision site. Superficial wound dehiscence in the central area of incision site. Mild TTP at surgical site. Patient was evaluated in ED.     Plan:  - No acute surgical intervention planned at this time  - Pain medication refill sent to pharmacy on file.  - NW until follow-up in clinic   - Pain  management per ED  - Orthopedics is signing off, please page with any questions/concerns  - Follow up in Dr. De La Torre's [facility] as scheduled on 7/14/2025 at [facility].  Please call [phone] to schedule or confirm appt.    30 minutes spent with patient reviewing objective subjective information, performing physical exam, discussing plan of care; with greater than 50% of that time spent in patient room.    This plan was discussed with attending on call, Dr. De La Torre.    Cristela Decker PA-C  Orthopaedic Trauma Surgery  Available via Epic Chat         [1] [2] [3]
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
## 2025-07-10 NOTE — DISCHARGE INSTRUCTIONS
Orthopedics will be sending refill of your narcotic pain medication to your pharmacy, [facility] on [address].  Please keep follow up already scheduled for 7/14/25.

## 2025-07-10 NOTE — PROGRESS NOTES
Patient evaluated in ED for post-op wound check. Patient states he has one pain pill left. DOS 6/25/2025. I have personally reviewed the OARRS report for the patient, no issues identified. This report is scanned into the EMR. I have considered the risks of abuse, dependence, addiction, and diversion. The 5 day Rx sent to pharmacy on file. JAMIE Decker, PAC

## 2025-07-14 ENCOUNTER — OFFICE VISIT (OUTPATIENT)
Dept: ORTHOPEDIC SURGERY | Facility: HOSPITAL | Age: 75
End: 2025-07-14
Payer: COMMERCIAL

## 2025-07-14 DIAGNOSIS — G56.02 CARPAL TUNNEL SYNDROME ON LEFT: Primary | ICD-10-CM

## 2025-07-14 PROCEDURE — 1159F MED LIST DOCD IN RCRD: CPT | Performed by: STUDENT IN AN ORGANIZED HEALTH CARE EDUCATION/TRAINING PROGRAM

## 2025-07-14 PROCEDURE — 1036F TOBACCO NON-USER: CPT | Performed by: STUDENT IN AN ORGANIZED HEALTH CARE EDUCATION/TRAINING PROGRAM

## 2025-07-14 PROCEDURE — 99202 OFFICE O/P NEW SF 15 MIN: CPT | Performed by: STUDENT IN AN ORGANIZED HEALTH CARE EDUCATION/TRAINING PROGRAM

## 2025-07-14 PROCEDURE — 99024 POSTOP FOLLOW-UP VISIT: CPT | Performed by: STUDENT IN AN ORGANIZED HEALTH CARE EDUCATION/TRAINING PROGRAM

## 2025-07-14 ASSESSMENT — PAIN - FUNCTIONAL ASSESSMENT: PAIN_FUNCTIONAL_ASSESSMENT: 0-10

## 2025-07-14 ASSESSMENT — PAIN DESCRIPTION - DESCRIPTORS: DESCRIPTORS: NUMBNESS

## 2025-07-14 NOTE — PROGRESS NOTES
History of Present Illness  S/p side: left carpal tunnel release on 6/25/2025  Patient returns today reports continued numbness and tingling in the left hand which is similar to his preoperative symptoms.  Of note he had constant numbness and tingling in the left hand prior to surgery which is consistent with severe carpal tunnel syndrome.  He had missed his first postoperative appointment.  He recently in the ED for concerns of wound dehiscence.  Endorses good ROM recovery.   No fever or drainage.     Review of Systems   GENERAL: Negative for malaise, significant weight loss, fever  MUSCULOSKELETAL: see HPI  NEURO:  Negative     Examination  side: left wrist  Incision is clean, dry and intact.  Healing well.  1 suture had pulled through with hypertrophic scar present.    No erythema or drainage   Sensation intact ulnar and radial nerve distribution.  Decreased sensation in the median nerve distribution to light touch.  2 point discrimination is not intact at 10 mm in the median nerve distribution.  It is intact at 8 mm and ulnar nerve distribution.  The contralateral hand does not have 2 point discrimination intact at 10 mm.  + Opposition of thumb  Good cap refill     Assessment:  Patient status post side: left carpal tunnel release     Plan  Sutures removed  Hypertrophic scar present in the mid section of the wound.  Discussed patient can progress to WBAT.  Reviewed ROM recovery for digits/wrist, and scar massage and desensitization techniques.  Follow-up: 6 weeks.  I would like to evaluate his hypertrophic scar and wound healing.

## 2025-07-16 ENCOUNTER — APPOINTMENT (OUTPATIENT)
Dept: ORTHOPEDIC SURGERY | Facility: HOSPITAL | Age: 75
End: 2025-07-16
Payer: COMMERCIAL

## 2025-08-20 ENCOUNTER — OFFICE VISIT (OUTPATIENT)
Dept: ORTHOPEDIC SURGERY | Facility: HOSPITAL | Age: 75
End: 2025-08-20
Payer: COMMERCIAL

## 2025-08-20 DIAGNOSIS — G56.02 CARPAL TUNNEL SYNDROME ON LEFT: Primary | ICD-10-CM

## 2025-08-20 PROCEDURE — 99024 POSTOP FOLLOW-UP VISIT: CPT | Performed by: STUDENT IN AN ORGANIZED HEALTH CARE EDUCATION/TRAINING PROGRAM

## 2025-08-20 PROCEDURE — 1159F MED LIST DOCD IN RCRD: CPT | Performed by: STUDENT IN AN ORGANIZED HEALTH CARE EDUCATION/TRAINING PROGRAM

## 2025-08-20 PROCEDURE — 99212 OFFICE O/P EST SF 10 MIN: CPT | Performed by: STUDENT IN AN ORGANIZED HEALTH CARE EDUCATION/TRAINING PROGRAM

## 2025-08-20 ASSESSMENT — PAIN - FUNCTIONAL ASSESSMENT: PAIN_FUNCTIONAL_ASSESSMENT: 0-10

## 2025-08-20 ASSESSMENT — PAIN SCALES - GENERAL: PAINLEVEL_OUTOF10: 5 - MODERATE PAIN

## (undated) DEVICE — Device

## (undated) DEVICE — APPLICATOR, CHLORAPREP, W/ORANGE TINT, 26ML

## (undated) DEVICE — CUFF, TOURNIQUET, 18 X 4, DUAL PORT/SNGL BLADDER, DISP, LF

## (undated) DEVICE — BANDAGE, ELASTIC, ACE, SELF-CLOSURE, 3 IN X 5 YD, NONSTERILE

## (undated) DEVICE — DRESSING, GAUZE, PETROLATUM, PATCH, XEROFORM, 1 X 8 IN, STERILE

## (undated) DEVICE — STRIP, SKIN CLOSURE, STERI STRIP, REINFORCED, 0.5 X 4 IN

## (undated) DEVICE — IMMOBILIZER, HAND, XLARGE, ALUMINUM

## (undated) DEVICE — NEEDLE, ELECTRODE, ELECTROSURGICAL, INSULATED

## (undated) DEVICE — ADHESIVE, SKIN, DERMABOND ADVANCED, 15CM, PEN-STYLE

## (undated) DEVICE — STOCKINETTE, DOUBLE PLY, 4 X 48 IN, STERILE

## (undated) DEVICE — MARKER, SKIN, DUAL TIP, W/RULER AND LABEL

## (undated) DEVICE — GOWN, SURGICAL, SMARTGOWN, XX-LARGE, STERILE

## (undated) DEVICE — GLOVE, SURGICAL, PROTEXIS PI , 7.5, PF, LF

## (undated) DEVICE — GLOVE, SURGICAL, PROTEXIS PI BLUE W/NEUTHERA, 7.5, PF, LF

## (undated) DEVICE — DRAPE, SHEET, U, W/ADHESIVE STRIP, IMPERVIOUS, 60 X 70 IN, DISPOSABLE, LF, STERILE

## (undated) DEVICE — SOLUTION, IRRIGATION, SODIUM CHLORIDE 0.9%, 1000 ML, POUR BOTTLE

## (undated) DEVICE — SUTURE, MONOCRYL, 3-0, 18 IN, PS2, UNDYED

## (undated) DEVICE — SOLUTION, ANTI FOG, W/SPONGE, ENDOMATE

## (undated) DEVICE — SUTURE, MONOCRYLIC, 4-0, P3, MONO 18

## (undated) DEVICE — SUTURE, ETHILON, 4-0, 18 IN, P-3, BLACK

## (undated) DEVICE — BANDAGE, GAUZE, 6 PLY, KERLIX, 2.25 IN X 3 YD, STERILE

## (undated) DEVICE — PADDING, WEBRIL, UNDERCAST, STERILE, 3 IN

## (undated) DEVICE — GLOVES, SURG BIOGEL, SZ-7.5, PF, LF

## (undated) DEVICE — DRAPE, TOWEL, STERI DRAPE, 17 X 11 IN, PLASTIC, STERILE

## (undated) DEVICE — SOLUTION, IRRIGATION, STERILE WATER, 1000 ML, POUR BOTTLE

## (undated) DEVICE — TOWEL PACK, STERILE, 4/PACK, BLUE

## (undated) DEVICE — DRESSING, GAUZE, SPONGE, 12 PLY, 4 X 4 IN, PLASTIC POUCH, STRL 10PK

## (undated) DEVICE — CAUTERY, PENCIL, PUSH BUTTON, SMOKE EVAC, 70MM

## (undated) DEVICE — NEEDLE, HYPODERMIC, MONOJECT, 27 G X 1.5 IN

## (undated) DEVICE — PREP, SKIN, BACTOSHEILD, 4%, 4OZ